# Patient Record
Sex: FEMALE | Race: WHITE | NOT HISPANIC OR LATINO | Employment: OTHER | URBAN - METROPOLITAN AREA
[De-identification: names, ages, dates, MRNs, and addresses within clinical notes are randomized per-mention and may not be internally consistent; named-entity substitution may affect disease eponyms.]

---

## 2017-09-27 ENCOUNTER — TRANSCRIBE ORDERS (OUTPATIENT)
Dept: ADMINISTRATIVE | Facility: HOSPITAL | Age: 77
End: 2017-09-27

## 2017-09-27 DIAGNOSIS — Z12.31 ENCOUNTER FOR MAMMOGRAM TO ESTABLISH BASELINE MAMMOGRAM: Primary | ICD-10-CM

## 2017-10-04 ENCOUNTER — HOSPITAL ENCOUNTER (OUTPATIENT)
Dept: MAMMOGRAPHY | Facility: CLINIC | Age: 77
Discharge: HOME | End: 2017-10-04
Payer: COMMERCIAL

## 2017-10-04 DIAGNOSIS — Z12.31 ENCOUNTER FOR MAMMOGRAM TO ESTABLISH BASELINE MAMMOGRAM: ICD-10-CM

## 2017-10-04 PROCEDURE — G0202 SCR MAMMO BI INCL CAD: HCPCS

## 2017-10-04 PROCEDURE — 77063 BREAST TOMOSYNTHESIS BI: CPT

## 2018-05-10 ENCOUNTER — OFFICE VISIT (OUTPATIENT)
Dept: OBGYN CLINIC | Facility: CLINIC | Age: 78
End: 2018-05-10
Payer: COMMERCIAL

## 2018-05-10 ENCOUNTER — APPOINTMENT (OUTPATIENT)
Dept: RADIOLOGY | Facility: CLINIC | Age: 78
End: 2018-05-10
Payer: COMMERCIAL

## 2018-05-10 VITALS — HEART RATE: 83 BPM | DIASTOLIC BLOOD PRESSURE: 75 MMHG | HEIGHT: 64 IN | SYSTOLIC BLOOD PRESSURE: 130 MMHG

## 2018-05-10 DIAGNOSIS — M25.562 LEFT KNEE PAIN, UNSPECIFIED CHRONICITY: ICD-10-CM

## 2018-05-10 DIAGNOSIS — M25.561 RIGHT KNEE PAIN, UNSPECIFIED CHRONICITY: ICD-10-CM

## 2018-05-10 DIAGNOSIS — M17.11 PRIMARY LOCALIZED OSTEOARTHRITIS OF RIGHT KNEE: Primary | ICD-10-CM

## 2018-05-10 DIAGNOSIS — M17.12 PRIMARY OSTEOARTHRITIS OF LEFT KNEE: ICD-10-CM

## 2018-05-10 DIAGNOSIS — M17.11 PRIMARY OSTEOARTHRITIS OF RIGHT KNEE: ICD-10-CM

## 2018-05-10 PROCEDURE — 99203 OFFICE O/P NEW LOW 30 MIN: CPT | Performed by: ORTHOPAEDIC SURGERY

## 2018-05-10 PROCEDURE — 20610 DRAIN/INJ JOINT/BURSA W/O US: CPT | Performed by: ORTHOPAEDIC SURGERY

## 2018-05-10 PROCEDURE — 73564 X-RAY EXAM KNEE 4 OR MORE: CPT

## 2018-05-10 RX ORDER — BUPROPION HYDROCHLORIDE 75 MG/1
TABLET ORAL
Refills: 3 | COMMUNITY
Start: 2018-05-05 | End: 2018-08-20

## 2018-05-10 RX ORDER — BETAMETHASONE SODIUM PHOSPHATE AND BETAMETHASONE ACETATE 3; 3 MG/ML; MG/ML
6 INJECTION, SUSPENSION INTRA-ARTICULAR; INTRALESIONAL; INTRAMUSCULAR; SOFT TISSUE
Status: COMPLETED | OUTPATIENT
Start: 2018-05-10 | End: 2018-05-10

## 2018-05-10 RX ORDER — PRAVASTATIN SODIUM 40 MG
TABLET ORAL
Refills: 3 | COMMUNITY
Start: 2018-02-27

## 2018-05-10 RX ORDER — LIDOCAINE HYDROCHLORIDE 10 MG/ML
5 INJECTION, SOLUTION INFILTRATION; PERINEURAL
Status: COMPLETED | OUTPATIENT
Start: 2018-05-10 | End: 2018-05-10

## 2018-05-10 RX ORDER — LOSARTAN POTASSIUM 50 MG/1
TABLET ORAL
COMMUNITY
End: 2018-08-20

## 2018-05-10 RX ORDER — CITALOPRAM 20 MG/1
20 TABLET ORAL DAILY
Refills: 11 | COMMUNITY
Start: 2018-03-14

## 2018-05-10 RX ORDER — MAGNESIUM 200 MG
TABLET ORAL
COMMUNITY

## 2018-05-10 RX ADMIN — BETAMETHASONE SODIUM PHOSPHATE AND BETAMETHASONE ACETATE 6 MG: 3; 3 INJECTION, SUSPENSION INTRA-ARTICULAR; INTRALESIONAL; INTRAMUSCULAR; SOFT TISSUE at 13:59

## 2018-05-10 RX ADMIN — LIDOCAINE HYDROCHLORIDE 5 ML: 10 INJECTION, SOLUTION INFILTRATION; PERINEURAL at 13:59

## 2018-05-10 NOTE — ASSESSMENT & PLAN NOTE
Patient with mild left knee osteoarthritis  She has done very well after cortisone injection a year ago  She will continue her activities and exercise  I will see her back as needed

## 2018-05-10 NOTE — ASSESSMENT & PLAN NOTE
Primary right knee osteoarthritis, mild to moderate  We discussed treatment options  She would like to proceed with a cortisone injection  Please refer to the procedure note  She was counseled on continuing her exercise, icing the knee, and resting as necessary  Follow-up as needed  We also discussed viscosupplementation

## 2018-05-10 NOTE — PROGRESS NOTES
Assessment:     1  Right knee pain, unspecified chronicity    2  Left knee pain, unspecified chronicity          Plan:     Problem List Items Addressed This Visit        Musculoskeletal and Integument    Primary osteoarthritis of right knee - Primary     Primary right knee osteoarthritis, mild to moderate  We discussed treatment options  She would like to proceed with a cortisone injection  Please refer to the procedure note  She was counseled on continuing her exercise, icing the knee, and resting as necessary  Follow-up as needed  We also discussed viscosupplementation  Primary osteoarthritis of left knee     Patient with mild left knee osteoarthritis  She has done very well after cortisone injection a year ago  She will continue her activities and exercise  I will see her back as needed  Relevant Orders    XR knee 4+ vw left injury           Patient ID: Jagdish Pizarro is a 76 y o  female  Chief Complaint:  Bilateral knee pain    HPI:  58-year-old female with bilateral knee pain  Left knee use to bother her more, but she had a cortisone injection about a year ago  Since then it has been much improved  She does feel better comfortable she standing on it for long periods of time, but overall does not have trouble with that  The right knee bothers her much more than the left  She has never had an injection here  Her primary issue was that the pain wakes her up in the middle of the night  When she is active, walking, and exercising regularly which she does, her knee does not bother her too much  She does note that twisting on the knee is painful and when she does exercises which she weeks in and out of the chairs she has to be careful  She does ice the knee regularly  She does find that he sometimes helps it more  She has not done any physical therapy recently  Patient's medical intake form was reviewed          Allergy:  Allergies   Allergen Reactions    Ace Inhibitors     Azithromycin Nausea Only    Celecoxib Hives    Penicillins Hives    Sulfa Antibiotics Hives       Medications:  all current active meds have been reviewed    Past Medical History:  Past Medical History:   Diagnosis Date    Cancer ([address] )     Hypertension     Osteoarthritis        Past Surgical History:  Past Surgical History:   Procedure Laterality Date    CARPAL TUNNEL RELEASE Bilateral     HIP SURGERY      HYSTERECTOMY      JOINT REPLACEMENT         Family History:  Family History   Problem Relation Age of Onset    Cancer Mother     Cancer Father     Cancer Sister        Social History:  History   Alcohol use Not on file     History   Drug use: Unknown     History   Smoking Status    Former Smoker   Smokeless Tobacco    Never Used           ROS:  Review of Systems   Constitutional: Negative  HENT: Negative  Eyes: Negative  Respiratory: Negative  Gastrointestinal: Negative  Endocrine: Positive for polyuria  Genitourinary: Negative  Musculoskeletal: Positive for arthralgias  Skin: Negative  Allergic/Immunologic: Negative  Neurological: Negative  Hematological: Negative  Psychiatric/Behavioral: The patient is nervous/anxious  Objective:  BP Readings from Last 1 Encounters:   05/10/18 130/75      Wt Readings from Last 1 Encounters:   No data found for Wt        BMI:   There is no height or weight on file to calculate BMI  EXAM:   Physical Exam   Constitutional: She is oriented to person, place, and time  She appears well-developed and well-nourished  No distress  HENT:   Head: Normocephalic and atraumatic  Eyes: Right eye exhibits no discharge  Left eye exhibits no discharge  Neck: Normal range of motion  Neck supple  No tracheal deviation present  Cardiovascular: Normal rate and regular rhythm  Pulmonary/Chest: Effort normal and breath sounds normal  No respiratory distress  She exhibits no tenderness  Abdominal: Soft  She exhibits no distension  There is no tenderness  Neurological: She is alert and oriented to person, place, and time  Skin: Skin is warm and dry  She is not diaphoretic  No erythema  Psychiatric: She has a normal mood and affect  Her behavior is normal    Vitals reviewed  Right Knee Exam     Comments:  Full range of motion, no effusion, no swelling, mild tenderness over the pes bursa, mild tenderness over the medial joint line, mild crepitus on knee range of motion with slight lateral patellar tracking, negative varus and valgus stress, negative Lachman, negative posterior drawer, negative Hui's      Left Knee Exam     Comments:  Full range of motion, no effusion, no swelling, no tenderness over the pes bursa, no tenderness over the medial joint line, no tenderness, mild crepitus on knee range of motion with slight lateral patellar tracking, negative varus and valgus stress, negative Lachman, negative posterior drawer, negative Hui's              Radiographs:  I have personally reviewed pertinent films in PACS and my interpretation is Mild osteoarthritis of the bilateral knees primarily the medial compartment on the right with some mild patellofemoral involvement, on the left is more the lateral compartment      Large joint arthrocentesis  Date/Time: 5/10/2018 1:59 PM  Consent given by: patient  Timeout: Immediately prior to procedure a time out was called to verify the correct patient, procedure, equipment, support staff and site/side marked as required   Supporting Documentation  Indications: pain   Procedure Details  Location: knee - R knee  Preparation: Patient was prepped and draped in the usual sterile fashion  Needle size: 22 G  Ultrasound guidance: no  Approach: superior  Medications administered: 6 mg betamethasone acetate-betamethasone sodium phosphate 6 (3-3) mg/mL; 5 mL lidocaine 1 %    Patient tolerance: patient tolerated the procedure well with no immediate complications  Dressing:  Sterile dressing applied

## 2018-08-20 ENCOUNTER — OFFICE VISIT (OUTPATIENT)
Dept: URGENT CARE | Facility: CLINIC | Age: 78
End: 2018-08-20
Payer: COMMERCIAL

## 2018-08-20 ENCOUNTER — APPOINTMENT (OUTPATIENT)
Dept: RADIOLOGY | Facility: CLINIC | Age: 78
End: 2018-08-20
Payer: COMMERCIAL

## 2018-08-20 VITALS
SYSTOLIC BLOOD PRESSURE: 152 MMHG | RESPIRATION RATE: 16 BRPM | OXYGEN SATURATION: 99 % | TEMPERATURE: 98 F | WEIGHT: 148 LBS | DIASTOLIC BLOOD PRESSURE: 69 MMHG | HEIGHT: 65 IN | BODY MASS INDEX: 24.66 KG/M2 | HEART RATE: 88 BPM

## 2018-08-20 DIAGNOSIS — T14.8XXA TORN TENDON: Primary | ICD-10-CM

## 2018-08-20 DIAGNOSIS — M79.645 FINGER PAIN, LEFT: ICD-10-CM

## 2018-08-20 PROCEDURE — 99213 OFFICE O/P EST LOW 20 MIN: CPT | Performed by: PREVENTIVE MEDICINE

## 2018-08-20 PROCEDURE — 73130 X-RAY EXAM OF HAND: CPT

## 2018-08-20 RX ORDER — LEVOTHYROXINE SODIUM 100 MCG
100 TABLET ORAL DAILY
Refills: 6 | COMMUNITY
Start: 2018-08-13

## 2018-08-20 NOTE — PROGRESS NOTES
[email or web address]        NAME: Jagdish Pizarro is a 76 y o  female  : 1940    MRN: [id number]  DATE: 2018  TIME: 11:04 AM    Assessment and Plan   Torn tendon [T14  8XXA]  1  Torn tendon     2  Finger pain, left  XR finger left fourth digit-ring         Patient Instructions       Follow up with PCP in 3-5 days  Proceed to  ER if symptoms worsen  Chief Complaint     Chief Complaint   Patient presents with    Hand Pain     Pt c/o left ring finger stiffness and pain for about 2 weeks  Pt states she is unable to straighten it  Denies injury  History of Present Illness       Two weeks ago she was pushing herself up using her left hand and felt an acute pain in the left hand  Since that time her left ring finger is stuck in a curved shape and she has difficulty extending it except passively          Review of Systems   Review of Systems      Current Medications       Current Outpatient Prescriptions:     citalopram (CeleXA) 20 mg tablet, Take 20 mg by mouth daily, Disp: , Rfl: 11    Magnesium 200 MG TABS, magnesium 200 mg tablet, Disp: , Rfl:     pravastatin (PRAVACHOL) 40 mg tablet, TAKE 1/2 TABLET BY MOUTH ONCE A WEEK, Disp: , Rfl: 3    SYNTHROID 100 MCG tablet, Take 100 mcg by mouth daily, Disp: , Rfl: 6    Current Allergies     Allergies as of 2018 - Reviewed 2018   Allergen Reaction Noted    Ace inhibitors      Azithromycin Nausea Only     Celecoxib Hives     Penicillins Hives     Sulfa antibiotics Hives             The following portions of the patient's history were reviewed and updated as appropriate: allergies, current medications, past family history, past medical history, past social history, past surgical history and problem list      Past Medical History:   Diagnosis Date    Cancer ([address] )     Hypertension     Osteoarthritis        Past Surgical History:   Procedure Laterality Date    CARPAL TUNNEL RELEASE Bilateral     HIP SURGERY      HYSTERECTOMY  JOINT REPLACEMENT         Family History   Problem Relation Age of Onset    Cancer Mother     Cancer Father     Cancer Sister          Medications have been verified  Objective   /69   Pulse 88   Temp 98 °F (36 7 °C)   Resp 16   Ht 5' 4 5" (1 638 m)   Wt 67 1 kg (148 lb)   SpO2 99%   BMI 25 01 kg/m²        Physical Exam     Physical Exam   Musculoskeletal:   The left ring finger is not warm red or swollen  The left ring finger seems stuck in a flexion position and she cannot extend that except passively       X-ray shows no acute changes

## 2018-10-09 ENCOUNTER — TRANSCRIBE ORDERS (OUTPATIENT)
Dept: ADMINISTRATIVE | Facility: HOSPITAL | Age: 78
End: 2018-10-09

## 2018-10-09 DIAGNOSIS — Z12.39 BREAST SCREENING: Primary | ICD-10-CM

## 2018-10-15 ENCOUNTER — HOSPITAL ENCOUNTER (OUTPATIENT)
Dept: MAMMOGRAPHY | Facility: CLINIC | Age: 78
Discharge: HOME | End: 2018-10-15
Payer: COMMERCIAL

## 2018-10-15 DIAGNOSIS — Z12.39 BREAST SCREENING: ICD-10-CM

## 2018-10-15 PROCEDURE — 77063 BREAST TOMOSYNTHESIS BI: CPT

## 2018-10-15 PROCEDURE — 77067 SCR MAMMO BI INCL CAD: CPT

## 2019-03-20 ENCOUNTER — TRANSCRIBE ORDERS (OUTPATIENT)
Dept: ADMINISTRATIVE | Facility: HOSPITAL | Age: 79
End: 2019-03-20

## 2019-03-20 DIAGNOSIS — Z13.820 SCREENING FOR OSTEOPOROSIS: Primary | ICD-10-CM

## 2019-04-01 ENCOUNTER — HOSPITAL ENCOUNTER (OUTPATIENT)
Dept: MAMMOGRAPHY | Facility: CLINIC | Age: 79
Discharge: HOME | End: 2019-04-01
Payer: COMMERCIAL

## 2019-04-01 DIAGNOSIS — Z13.820 SCREENING FOR OSTEOPOROSIS: ICD-10-CM

## 2019-04-01 PROCEDURE — 77080 DXA BONE DENSITY AXIAL: CPT

## 2019-10-21 ENCOUNTER — TRANSCRIBE ORDERS (OUTPATIENT)
Dept: ADMINISTRATIVE | Facility: HOSPITAL | Age: 79
End: 2019-10-21

## 2019-10-21 DIAGNOSIS — Z12.31 SCREENING MAMMOGRAM FOR HIGH-RISK PATIENT: Primary | ICD-10-CM

## 2019-11-15 ENCOUNTER — HOSPITAL ENCOUNTER (OUTPATIENT)
Dept: MAMMOGRAPHY | Facility: CLINIC | Age: 79
Discharge: HOME | End: 2019-11-15
Payer: COMMERCIAL

## 2019-11-15 VITALS — WEIGHT: 143 LBS | BODY MASS INDEX: 24.41 KG/M2 | HEIGHT: 64 IN

## 2019-11-15 DIAGNOSIS — Z12.31 SCREENING MAMMOGRAM FOR HIGH-RISK PATIENT: ICD-10-CM

## 2019-11-15 PROCEDURE — 77067 SCR MAMMO BI INCL CAD: CPT

## 2019-11-15 PROCEDURE — 77063 BREAST TOMOSYNTHESIS BI: CPT

## 2019-11-26 ENCOUNTER — HOSPITAL ENCOUNTER (OUTPATIENT)
Dept: MAMMOGRAPHY | Facility: CLINIC | Age: 79
Discharge: HOME | End: 2019-11-26
Payer: COMMERCIAL

## 2019-11-26 ENCOUNTER — HOSPITAL ENCOUNTER (OUTPATIENT)
Dept: ULTRASOUND IMAGING | Facility: CLINIC | Age: 79
Discharge: HOME | End: 2019-11-26
Payer: COMMERCIAL

## 2019-11-26 VITALS — WEIGHT: 143 LBS | HEIGHT: 64 IN | BODY MASS INDEX: 24.41 KG/M2

## 2019-11-26 DIAGNOSIS — R92.8 ABNORMAL MAMMOGRAM: ICD-10-CM

## 2019-11-26 PROCEDURE — G0279 TOMOSYNTHESIS, MAMMO: HCPCS

## 2019-11-26 PROCEDURE — 77065 DX MAMMO INCL CAD UNI: CPT

## 2020-11-24 ENCOUNTER — TRANSCRIBE ORDERS (OUTPATIENT)
Dept: ADMINISTRATIVE | Facility: HOSPITAL | Age: 80
End: 2020-11-24

## 2020-11-24 DIAGNOSIS — Z12.31 ENCOUNTER FOR SCREENING MAMMOGRAM FOR MALIGNANT NEOPLASM OF BREAST: Primary | ICD-10-CM

## 2020-12-21 ENCOUNTER — HOSPITAL ENCOUNTER (OUTPATIENT)
Dept: MAMMOGRAPHY | Facility: CLINIC | Age: 80
Discharge: HOME | End: 2020-12-21
Payer: COMMERCIAL

## 2020-12-21 DIAGNOSIS — Z12.31 ENCOUNTER FOR SCREENING MAMMOGRAM FOR MALIGNANT NEOPLASM OF BREAST: ICD-10-CM

## 2020-12-21 PROCEDURE — 77067 SCR MAMMO BI INCL CAD: CPT

## 2020-12-21 PROCEDURE — 77063 BREAST TOMOSYNTHESIS BI: CPT

## 2021-12-28 ENCOUNTER — HOSPITAL ENCOUNTER (OUTPATIENT)
Dept: MAMMOGRAPHY | Facility: CLINIC | Age: 81
Discharge: HOME | End: 2021-12-28
Payer: COMMERCIAL

## 2021-12-28 VITALS — WEIGHT: 143.08 LBS | HEIGHT: 64 IN | BODY MASS INDEX: 24.43 KG/M2

## 2021-12-28 DIAGNOSIS — Z12.31 ENCOUNTER FOR SCREENING MAMMOGRAM FOR MALIGNANT NEOPLASM OF BREAST: ICD-10-CM

## 2021-12-28 PROCEDURE — 77067 SCR MAMMO BI INCL CAD: CPT

## 2021-12-28 PROCEDURE — 77063 BREAST TOMOSYNTHESIS BI: CPT

## 2022-01-24 ENCOUNTER — HOSPITAL ENCOUNTER (OUTPATIENT)
Dept: ULTRASOUND IMAGING | Facility: CLINIC | Age: 82
Discharge: HOME | End: 2022-01-24
Payer: COMMERCIAL

## 2022-01-24 ENCOUNTER — HOSPITAL ENCOUNTER (OUTPATIENT)
Dept: MAMMOGRAPHY | Facility: CLINIC | Age: 82
Discharge: HOME | End: 2022-01-24
Payer: COMMERCIAL

## 2022-01-24 VITALS — BODY MASS INDEX: 24.41 KG/M2 | HEIGHT: 64 IN | WEIGHT: 143 LBS

## 2022-01-24 DIAGNOSIS — R92.8 ABNORMAL SCREENING MAMMOGRAM: ICD-10-CM

## 2022-01-24 PROCEDURE — 76642 ULTRASOUND BREAST LIMITED: CPT

## 2022-01-24 PROCEDURE — 77065 DX MAMMO INCL CAD UNI: CPT

## 2022-01-24 PROCEDURE — G0279 TOMOSYNTHESIS, MAMMO: HCPCS

## 2022-06-05 ENCOUNTER — APPOINTMENT (EMERGENCY)
Dept: RADIOLOGY | Facility: HOSPITAL | Age: 82
End: 2022-06-05
Payer: COMMERCIAL

## 2022-06-05 ENCOUNTER — HOSPITAL ENCOUNTER (EMERGENCY)
Facility: HOSPITAL | Age: 82
Discharge: LEFT BEFORE DISCHARGE | End: 2022-06-05
Attending: EMERGENCY MEDICINE
Payer: COMMERCIAL

## 2022-06-05 VITALS
DIASTOLIC BLOOD PRESSURE: 56 MMHG | TEMPERATURE: 98.2 F | OXYGEN SATURATION: 100 % | BODY MASS INDEX: 24.41 KG/M2 | HEART RATE: 85 BPM | SYSTOLIC BLOOD PRESSURE: 152 MMHG | WEIGHT: 143 LBS | HEIGHT: 64 IN | RESPIRATION RATE: 12 BRPM

## 2022-06-05 DIAGNOSIS — R07.9 CHEST PAIN: Primary | ICD-10-CM

## 2022-06-05 DIAGNOSIS — Z53.29 LEFT AGAINST MEDICAL ADVICE: ICD-10-CM

## 2022-06-05 PROCEDURE — 71045 X-RAY EXAM CHEST 1 VIEW: CPT

## 2022-06-05 PROCEDURE — 93005 ELECTROCARDIOGRAM TRACING: CPT

## 2022-06-05 PROCEDURE — 99285 EMERGENCY DEPT VISIT HI MDM: CPT | Performed by: EMERGENCY MEDICINE

## 2022-06-05 PROCEDURE — 99285 EMERGENCY DEPT VISIT HI MDM: CPT

## 2022-06-06 NOTE — ED NOTES
Pt stated that she is feeling better  Pt states that she doesn't want to be stuck for blood work  Pt states that she doesn't feel like she needs to be here and wants to go home   RN made Er doctor aware      Caitlin Hardy RN  06/05/22 2057

## 2022-06-06 NOTE — DISCHARGE INSTRUCTIONS
Return to the ER if your symptoms worsen or you change your mind about having further evaluation and testing done as was recommended

## 2022-06-06 NOTE — ED PROVIDER NOTES
History  Chief Complaint   Patient presents with    Chest Pain     Pt arrived to ED from home with c/o chest pain (not active) 1830 for approx 1 5 hours  This is an 33-year-old female presents for evaluation of substernal chest pressure that started at 6:30 p m  this evening while cooking and resolved spontaneously after 40 minutes  No nausea vomiting shortness of breath or fevers  She is a former smoker and is currently under treatment for hypercholesterolemia  Does have a history of indigestion and took Tylenol  History provided by:  Patient  Medical Problem  Location:  Substernal  Quality:  Pressure  Severity:  Moderate  Onset quality:  Sudden  Duration:  40 minutes  Chronicity:  New  Context:  Substernal chest pain  Relieved by:  Time  Associated symptoms: chest pain        Prior to Admission Medications   Prescriptions Last Dose Informant Patient Reported? Taking? Magnesium 200 MG TABS  Self Yes No   Sig: magnesium 200 mg tablet   SYNTHROID 100 MCG tablet   Yes No   Sig: Take 100 mcg by mouth daily   citalopram (CeleXA) 20 mg tablet  Self Yes No   Sig: Take 20 mg by mouth daily   pravastatin (PRAVACHOL) 40 mg tablet  Self Yes No   Sig: TAKE 1/2 TABLET BY MOUTH ONCE A WEEK      Facility-Administered Medications: None       Past Medical History:   Diagnosis Date    [facility]    Bladder    Hypertension     Osteoarthritis        Past Surgical History:   Procedure Laterality Date    CARPAL TUNNEL RELEASE Bilateral     HIP SURGERY      HYSTERECTOMY      age 41    JOINT REPLACEMENT      OOPHORECTOMY Right     age 41       Family History   Problem Relation Age of Onset    Ovarian cancer Mother 64    Cancer Father 78        unsure primary   suspects colon    Breast cancer Sister 66    No Known Problems Maternal Grandmother     No Known Problems Maternal Grandfather     Breast cancer Paternal Grandmother 67    Cancer Paternal Grandfather 80        unsure primary    Colon cancer Maternal Aunt 80    No Known Problems Maternal Aunt     No Known Problems Maternal Aunt     Cancer Paternal Aunt         unknown primary or age of onset    Throat cancer Son 48    No Known Problems Son     No Known Problems Son     No Known Problems Son     Cancer Paternal Aunt         unknown primary or age of onset   Judithe Spruce Cancer Paternal Aunt         unknown primary or age of onset   Judithe Spruce Cancer Paternal Aunt         unknown primary or age of onset   Judithe Spruce Cancer Paternal Aunt         unknown primary or age of onset     I have reviewed and agree with the history as documented  E-Cigarette/Vaping    E-Cigarette Use Never User      E-Cigarette/Vaping Substances    Nicotine No     THC No     CBD No     Flavoring No     Other No     Unknown No      Social History     Tobacco Use    Smoking status: Former Smoker    Smokeless tobacco: Never Used   Vaping Use    Vaping Use: Never used   Substance Use Topics    Alcohol use: Yes     Alcohol/week: 1 0 standard drink     Types: 1 Cans of beer per week    Drug use: Never       Review of Systems   Cardiovascular: Positive for chest pain  All other systems reviewed and are negative  Physical Exam  Physical Exam  Vitals and nursing note reviewed  Constitutional:       General: She is not in acute distress  Appearance: She is not ill-appearing, toxic-appearing or diaphoretic  HENT:      Head: Normocephalic and atraumatic  Right Ear: External ear normal       Left Ear: External ear normal    Eyes:      General: No scleral icterus  Right eye: No discharge  Left eye: No discharge  Extraocular Movements: Extraocular movements intact  Pupils: Pupils are equal, round, and reactive to light  Cardiovascular:      Rate and Rhythm: Normal rate and regular rhythm  Pulses: Normal pulses  Heart sounds: No murmur heard  No friction rub  No gallop     Pulmonary:      Effort: Pulmonary effort is normal  No respiratory distress  Breath sounds: No stridor  No wheezing, rhonchi or rales  Abdominal:      General: There is no distension  Palpations: Abdomen is soft  Tenderness: There is no abdominal tenderness  There is no guarding or rebound  Musculoskeletal:         General: No swelling, tenderness, deformity or signs of injury  Normal range of motion  Cervical back: Normal range of motion and neck supple  No rigidity or tenderness  Right lower leg: No edema  Left lower leg: No edema  Skin:     General: Skin is warm and dry  Coloration: Skin is not jaundiced  Findings: No erythema or rash  Neurological:      General: No focal deficit present  Mental Status: She is alert and oriented to person, place, and time  Cranial Nerves: No cranial nerve deficit  Sensory: No sensory deficit  Motor: No weakness  Coordination: Coordination normal       Gait: Gait normal    Psychiatric:         Mood and Affect: Mood normal          Behavior: Behavior normal          Thought Content:  Thought content normal          Vital Signs  ED Triage Vitals [06/05/22 2022]   Temperature Pulse Respirations Blood Pressure SpO2   98 2 °F (36 8 °C) 83 16 166/71 99 %      Temp Source Heart Rate Source Patient Position - Orthostatic VS BP Location FiO2 (%)   Temporal Monitor Sitting Left arm --      Pain Score       No Pain           Vitals:    06/05/22 2022 06/05/22 2030   BP: 166/71 152/56   Pulse: 83 85   Patient Position - Orthostatic VS: Sitting          Visual Acuity      ED Medications  Medications - No data to display    Diagnostic Studies  Results Reviewed     None                 XR chest 1 view portable    (Results Pending)              Procedures  ECG 12 Lead Documentation Only    Date/Time: 6/5/2022 8:46 PM  Performed by: Nayely Green DO  Authorized by: Nayely Green DO     ECG reviewed by me, the ED Provider: yes    Patient location:  ED  Rate:     ECG rate:  87  Rhythm: Rhythm: sinus rhythm    Conduction:     Conduction: normal    T waves:     T waves: normal               ED Course  ED Course as of 06/05/22 2103   Estrellita Younger Jun 05, 2022 2049 Patient is a very hard IV stick and having difficulty obtaining blood work patient states that this always happens to her and that she does not want to stay to have any further evaluation done she understands the risk of heart attack and is able to make her own decisions and understand the consequences she will sign out against medical advice                                             MDM  Number of Diagnoses or Management Options  Chest pain  Left against medical advice  Diagnosis management comments: Chest pain rule out acute coronary syndrome versus indigestion musculoskeletal pain anxiety workup in progress EKG chest x-ray and lab work       Amount and/or Complexity of Data Reviewed  Clinical lab tests: ordered  Tests in the radiology section of CPT®: ordered    Patient Progress  Patient progress: (No acute cardiopulmonary disease seen on chest x-ray by me)      Disposition  Final diagnoses:   Chest pain   Left against medical advice     Time reflects when diagnosis was documented in both MDM as applicable and the Disposition within this note     Time User Action Codes Description Comment    6/5/2022  8:51 PM Hannah Colin [R07 [id number]] Chest pain     6/5/2022  8:51 PM Hannah Colin [[id number]] Left against medical advice       ED Disposition     ED Disposition   AMA    Condition   --    Date/Time   Sun Jun 5, 2022  8:51 PM    Comment   Date: 6/5/2022  Patient: Tsering Grey  Admitted: 6/5/2022  8:17 PM  Attending Provider: Tex Campos DO    Tsering Grey or her authorized caregiver has made the decision for the patient to leave the emergency department against the advice of  her attending physician  She or her authorized caregiver has been informed and understands the inherent risks, including death, heart attack and poor outcome  She or her authorized caregiver has decided to accept the responsibility for this emilie Ramsey and all necessary parties have been advised that she may return for further evaluation or treatment  Her condition at time of discharge was clear thinking and may able to make her own decisions and understand the consequences  Cat luz maria Tadeo had current vital signs as follows:  /56   Pulse 85   Temp 98 2 °F (36 8 °C) (Temporal)   Resp 12   Ht 5' 4" (1 626 m)   Wt 64 9 kg (143 lb)            Follow-up Information     Follow up With Specialties Details Why Contact Info Additional [address] [place], [place] Nurse Practitioner   [address] [facility] [phone]  [address] [facility]        [address] Emergency Department Emergency Medicine  As needed, If symptoms worsen [address] [phone]  [address] [facility], [address] [facility] [address]          Discharge Medication List as of 6/5/2022  8:52 PM      CONTINUE these medications which have NOT CHANGED    Details   citalopram (CeleXA) 20 mg tablet Take 20 mg by mouth daily, Starting Wed 3/14/2018, Historical Med      Magnesium 200 MG TABS magnesium 200 mg tablet, Historical Med      pravastatin (PRAVACHOL) 40 mg tablet TAKE 1/2 TABLET BY MOUTH ONCE A WEEK, Historical Med      SYNTHROID 100 MCG tablet Take 100 mcg by mouth daily, Starting Mon 8/13/2018, Historical Med             No discharge procedures on file      PDMP Review     None          ED Provider  Electronically Signed by           Ashu Ferrell DO  06/05/22 7028

## 2022-06-08 LAB
ATRIAL RATE: 87 BPM
P AXIS: 84 DEGREES
PR INTERVAL: 168 MS
QRS AXIS: 84 DEGREES
QRSD INTERVAL: 80 MS
QT INTERVAL: 348 MS
QTC INTERVAL: 418 MS
T WAVE AXIS: 71 DEGREES
VENTRICULAR RATE: 87 BPM

## 2022-06-08 PROCEDURE — 93010 ELECTROCARDIOGRAM REPORT: CPT | Performed by: INTERNAL MEDICINE

## 2023-02-08 ENCOUNTER — HOSPITAL ENCOUNTER (OUTPATIENT)
Dept: BONE DENSITY | Facility: CLINIC | Age: 83
Discharge: HOME | End: 2023-02-08

## 2023-02-08 ENCOUNTER — HOSPITAL ENCOUNTER (OUTPATIENT)
Dept: MAMMOGRAPHY | Facility: CLINIC | Age: 83
Discharge: HOME | End: 2023-02-08

## 2023-02-08 VITALS — HEIGHT: 64 IN | BODY MASS INDEX: 23.9 KG/M2 | WEIGHT: 140 LBS

## 2023-02-08 DIAGNOSIS — M85.80 OTHER SPECIFIED DISORDERS OF BONE DENSITY AND STRUCTURE, UNSPECIFIED SITE: ICD-10-CM

## 2023-02-08 DIAGNOSIS — Z12.31 SCREENING MAMMOGRAM, ENCOUNTER FOR: ICD-10-CM

## 2024-04-17 ENCOUNTER — HOSPITAL ENCOUNTER (OUTPATIENT)
Dept: MAMMOGRAPHY | Facility: CLINIC | Age: 84
Discharge: HOME | End: 2024-04-17
Payer: COMMERCIAL

## 2024-04-17 VITALS — BODY MASS INDEX: 23.9 KG/M2 | WEIGHT: 140 LBS | HEIGHT: 64 IN

## 2024-04-17 DIAGNOSIS — Z12.31 VISIT FOR SCREENING MAMMOGRAM: ICD-10-CM

## 2024-04-17 PROCEDURE — 77063 BREAST TOMOSYNTHESIS BI: CPT

## 2024-04-17 PROCEDURE — 77067 SCR MAMMO BI INCL CAD: CPT

## 2025-02-12 ENCOUNTER — TELEPHONE (OUTPATIENT)
Dept: GASTROENTEROLOGY | Facility: CLINIC | Age: 85
End: 2025-02-12

## 2025-02-12 RX ORDER — CHOLECALCIFEROL (VITAMIN D3) 25 MCG
TABLET ORAL
COMMUNITY

## 2025-02-12 RX ORDER — VIT C/B6/B5/MAGNESIUM/HERB 173 50-5-6-5MG
CAPSULE ORAL
COMMUNITY

## 2025-02-12 RX ORDER — ESTRADIOL 0.1 MG/G
CREAM VAGINAL
COMMUNITY
Start: 2024-11-06

## 2025-02-12 RX ORDER — BETAMETHASONE DIPROPIONATE 0.5 MG/G
CREAM TOPICAL 2 TIMES DAILY
COMMUNITY

## 2025-02-12 RX ORDER — LATANOPROST 50 UG/ML
SOLUTION/ DROPS OPHTHALMIC
COMMUNITY
Start: 2025-01-31

## 2025-02-12 RX ORDER — CETIRIZINE HYDROCHLORIDE 10 MG/1
10 TABLET, CHEWABLE ORAL DAILY
COMMUNITY

## 2025-02-26 ENCOUNTER — TELEPHONE (OUTPATIENT)
Dept: GASTROENTEROLOGY | Facility: CLINIC | Age: 85
End: 2025-02-26

## 2025-02-26 ENCOUNTER — OFFICE VISIT (OUTPATIENT)
Dept: GASTROENTEROLOGY | Facility: CLINIC | Age: 85
End: 2025-02-26
Payer: COMMERCIAL

## 2025-02-26 VITALS
WEIGHT: 138 LBS | BODY MASS INDEX: 23.56 KG/M2 | HEIGHT: 64 IN | SYSTOLIC BLOOD PRESSURE: 122 MMHG | DIASTOLIC BLOOD PRESSURE: 64 MMHG

## 2025-02-26 DIAGNOSIS — K59.04 CHRONIC IDIOPATHIC CONSTIPATION: ICD-10-CM

## 2025-02-26 DIAGNOSIS — K64.8 OTHER HEMORRHOIDS: ICD-10-CM

## 2025-02-26 DIAGNOSIS — D50.8 OTHER IRON DEFICIENCY ANEMIA: Primary | ICD-10-CM

## 2025-02-26 PROBLEM — C43.9 MELANOMA (HCC): Status: ACTIVE | Noted: 2025-02-26

## 2025-02-26 PROBLEM — C67.9 BLADDER CANCER (HCC): Status: ACTIVE | Noted: 2025-02-26

## 2025-02-26 PROBLEM — D64.9 ABSOLUTE ANEMIA: Status: ACTIVE | Noted: 2025-02-26

## 2025-02-26 PROCEDURE — 99204 OFFICE O/P NEW MOD 45 MIN: CPT | Performed by: INTERNAL MEDICINE

## 2025-02-26 RX ORDER — POLYETHYLENE GLYCOL 3350 17 G/17G
238 POWDER, FOR SOLUTION ORAL ONCE
Qty: 238 G | Refills: 0 | Status: SHIPPED | OUTPATIENT
Start: 2025-02-26 | End: 2025-02-26

## 2025-02-26 RX ORDER — SODIUM CHLORIDE, SODIUM LACTATE, POTASSIUM CHLORIDE, CALCIUM CHLORIDE 600; 310; 30; 20 MG/100ML; MG/100ML; MG/100ML; MG/100ML
125 INJECTION, SOLUTION INTRAVENOUS CONTINUOUS
OUTPATIENT
Start: 2025-02-26

## 2025-02-26 RX ORDER — FERROUS SULFATE 325(65) MG
325 TABLET, DELAYED RELEASE (ENTERIC COATED) ORAL EVERY OTHER DAY
COMMUNITY

## 2025-02-26 NOTE — ASSESSMENT & PLAN NOTE
84F here today with her son as a new patient at the request of CASEY Dykes for new iron deficiency anemia. CMP shows no evidence of CKD. Low ferritin. No GIB.     No other GI complaints.  Discussed the risks and benefits of proceeding with EGD and colonoscopy.  After discussing all the various options, patient and the son would like to proceed with an EGD and colonoscopy to assess the anemia.  Differential diagnosis includes peptic ulcer disease, AVMs, malignancy.    Continue iron supplementation, take daily with Colace.    Orders:  •  EGD; Future  •  Colonoscopy; Future  •  polyethylene glycol (MiraLax) 17 GM/SCOOP powder; Take 238 g by mouth once for 1 dose Take 238 g my mouth. Use as directed

## 2025-02-26 NOTE — PROGRESS NOTES
Name: Rosalba Wolfe      : 1940      MRN: [id number]  Encounter Provider: Sonia Calvillo MD  Encounter Date: 2025   Encounter department: [facility]  :  Assessment & Plan  Other iron deficiency anemia  84F here today with her son as a new patient at the request of CASEY Dykes for new iron deficiency anemia. CMP shows no evidence of CKD. Low ferritin. No GIB.     No other GI complaints.  Discussed the risks and benefits of proceeding with EGD and colonoscopy.  After discussing all the various options, patient and the son would like to proceed with an EGD and colonoscopy to assess the anemia.  Differential diagnosis includes peptic ulcer disease, AVMs, malignancy.    Continue iron supplementation, take daily with Colace.    Orders:  •  EGD; Future  •  Colonoscopy; Future  •  polyethylene glycol (MiraLax) 17 GM/SCOOP powder; Take 238 g by mouth once for 1 dose Take 238 g my mouth. Use as directed    Other hemorrhoids  Consider hemorrhoid banding post colonoscopy if needed.       Chronic idiopathic constipation  Recommend adding MiraLAX and Colace.           History of Present Illness     Rosalba Wolfe is a 84 y.o. female who presents today at the request of her PCP for iron deficiency anemia.  This is new.  Annual blood work this year in January showed new anemia, normocytic with low ferritin.  Patient denies any significant GI issues.  Says intermittently she gets constipated.  She is always had hemorrhoid bleeding for decades.  Last colonoscopy was in 2017.  She has never had the hemorrhoids banded.  She denies any nausea vomiting or abdominal pains.  She does not eat a lot of red meat.    History obtained from: patient    Review of Systems   Constitutional:  Negative for chills and fever.   HENT:  Negative for ear pain and sore throat.    Eyes:  Negative for pain and visual disturbance.   Respiratory:  Negative for cough and shortness of breath.     Cardiovascular:  Negative for chest pain and palpitations.   Gastrointestinal:  Negative for abdominal pain and vomiting.   Genitourinary:  Negative for dysuria and hematuria.   Musculoskeletal:  Negative for arthralgias and back pain.   Skin:  Negative for color change and rash.   Neurological:  Negative for seizures and syncope.   All other systems reviewed and are negative.      Past Medical History   Past Medical History:   Diagnosis Date   • Anemia    • Anxiety    • Cancer (HCC) 2015    Bladder   • Chronic constipation    • Environmental allergies    • GERD (gastroesophageal reflux disease)    • Hyperlipidemia    • Hypertension    • Hypothyroidism    • Irritable bowel syndrome    • Osteoarthritis    • Pelvic floor weakness in female    • Thyroid disorder      Past Surgical History:   Procedure Laterality Date   • CARPAL TUNNEL RELEASE Bilateral    • COLONOSCOPY  11/28/2017    Dr. Hughes   • HIP SURGERY     • HYSTERECTOMY      age 40   • JOINT REPLACEMENT     • OOPHORECTOMY Right     age 40     Family History   Problem Relation Age of Onset   • Ovarian cancer Mother 56   • Colon cancer Father    • Cancer Father 79        unsure primary. suspects colon   • No Known Problems Maternal Grandmother    • No Known Problems Maternal Grandfather    • Breast cancer Paternal Grandmother 72   • Cancer Paternal Grandfather 85        unsure primary   • Colon cancer Maternal Aunt 90   • No Known Problems Maternal Aunt    • No Known Problems Maternal Aunt    • Cancer Paternal Aunt         unknown primary or age of onset   • Cancer Paternal Aunt         unknown primary or age of onset   • Cancer Paternal Aunt         unknown primary or age of onset   • Cancer Paternal Aunt         unknown primary or age of onset   • Cancer Paternal Aunt         unknown primary or age of onset   • Throat cancer Son 50   • No Known Problems Son    • No Known Problems Son    • No Known Problems Son    • Colon polyps Neg Hx       reports that she  has quit smoking. She has never used smokeless tobacco. She reports current alcohol use of about 1.0 standard drink of alcohol per week. She reports that she does not use drugs.  Current Outpatient Medications   Medication Instructions   • betamethasone dipropionate (DIPROSONE) 0.05 % cream Topical, As needed   • cetirizine (ZYRTEC) 10 mg, Oral, As needed   • cholecalciferol (VITAMIN D3) 1,000 units tablet Oral   • citalopram (CELEXA) 20 mg, Daily   • estradiol (ESTRACE) 0.1 mg/g vaginal cream Vaginal   • ferrous sulfate 325 mg, Every other day   • latanoprost (XALATAN) 0.005 % ophthalmic solution INSTILL ONE DROP IN THE RIGHT EYE DAILY AT BEDTIME   • Magnesium 200 MG TABS magnesium 200 mg tablet   • polyethylene glycol (MIRALAX) 238 g, Oral, Once, Take 238 g my mouth. Use as directed   • Synthroid 75 mcg, Oral, Daily   • Turmeric 500 MG CAPS      Allergies   Allergen Reactions   • Ace Inhibitors    • Azithromycin Nausea Only   • Celecoxib Hives   • Penicillins Hives   • Sulfa Antibiotics Hives      Current Outpatient Medications on File Prior to Visit   Medication Sig Dispense Refill   • betamethasone dipropionate (DIPROSONE) 0.05 % cream Apply topically as needed     • cetirizine (ZyrTEC) 10 MG chewable tablet Chew 10 mg as needed     • cholecalciferol (VITAMIN D3) 1,000 units tablet Take by mouth     • citalopram (CeleXA) 20 mg tablet Take 20 mg by mouth daily  11   • estradiol (ESTRACE) 0.1 mg/g vaginal cream Insert into the vagina     • ferrous sulfate 325 (65 FE) MG EC tablet Take 325 mg by mouth every other day     • latanoprost (XALATAN) 0.005 % ophthalmic solution INSTILL ONE DROP IN THE RIGHT EYE DAILY AT BEDTIME     • Magnesium 200 MG TABS magnesium 200 mg tablet     • SYNTHROID 100 MCG tablet Take 75 mcg by mouth daily  6   • Turmeric 500 MG CAPS      • [DISCONTINUED] pravastatin (PRAVACHOL) 40 mg tablet TAKE 1/2 TABLET BY MOUTH ONCE A WEEK (Patient not taking: Reported on 2/26/2025)  3     No current  "facility-administered medications on file prior to visit.      Social History     Tobacco Use   • Smoking status: Former   • Smokeless tobacco: Never   Vaping Use   • Vaping status: Never Used   Substance and Sexual Activity   • Alcohol use: Yes     Alcohol/week: 1.0 standard drink of alcohol     Types: 1 Cans of beer per week   • Drug use: Never   • Sexual activity: Not Currently        Objective   /64   Ht 5' 4\" (1.626 m)   Wt 62.6 kg (138 lb)   BMI 23.69 kg/m²      Physical Exam  Vitals and nursing note reviewed.   Constitutional:       General: She is not in acute distress.     Appearance: She is well-developed.   HENT:      Head: Normocephalic and atraumatic.   Eyes:      General: No scleral icterus.     Conjunctiva/sclera: Conjunctivae normal.   Cardiovascular:      Rate and Rhythm: Normal rate and regular rhythm.      Heart sounds: Normal heart sounds. No murmur heard.  Pulmonary:      Effort: Pulmonary effort is normal. No respiratory distress.      Breath sounds: Normal breath sounds.   Abdominal:      General: Abdomen is flat. Bowel sounds are normal. There is no distension.      Palpations: Abdomen is soft.      Tenderness: There is no abdominal tenderness.   Musculoskeletal:         General: No swelling.      Cervical back: Neck supple.   Skin:     General: Skin is warm and dry.   Neurological:      General: No focal deficit present.      Mental Status: She is alert and oriented to person, place, and time.   Psychiatric:         Mood and Affect: Mood normal.           "

## 2025-02-26 NOTE — TELEPHONE ENCOUNTER
Scheduled date of colonoscopy (as of today):03/11/25  Physician performing colonoscopy:Shin  Location of colonoscopy:[facility]  Bowel prep reviewed with patient:Miralax  Instructions reviewed with patient by:verbal and folder - DS  Clearances: NONE

## 2025-02-27 ENCOUNTER — TELEPHONE (OUTPATIENT)
Dept: GASTROENTEROLOGY | Facility: CLINIC | Age: 85
End: 2025-02-27

## 2025-02-27 DIAGNOSIS — Z12.11 SCREENING FOR COLON CANCER: Primary | ICD-10-CM

## 2025-02-27 RX ORDER — SODIUM PICOSULFATE, MAGNESIUM OXIDE, AND ANHYDROUS CITRIC ACID 12; 3.5; 1 G/175ML; G/175ML; MG/175ML
LIQUID ORAL
Qty: 350 ML | Refills: 0 | Status: SHIPPED | OUTPATIENT
Start: 2025-02-27

## 2025-02-27 NOTE — TELEPHONE ENCOUNTER
Procedure confirmed  Colonoscopy/Endoscopy     Via: [facility]    Instructions given: Given to Patient at Visit     Prep Given: Miralax/Dulcolax    Call the office if there are any questions.

## 2025-02-27 NOTE — TELEPHONE ENCOUNTER
Pt is calling in to ask for an alternative prep to the iqra/dul. Pt is looking for an alternate prep and said she would prefer lower volume even if she needs to pay out of pocket. The pt does not like the taste of gatorade. Please reach back out to the pt with an alternate prep if possible and she would need the prep instructions for it as well

## 2025-03-03 NOTE — TELEPHONE ENCOUNTER
Pt calling because she would like to speak with Dr Calvillo in regards to concerns she has about her bowel prep. She states she has a prolapsed bladder and has some concerns about doing her prep with this condition. Pt can be reached at [phone].

## 2025-03-04 ENCOUNTER — ANESTHESIA EVENT (OUTPATIENT)
Dept: ANESTHESIOLOGY | Facility: AMBULATORY SURGERY CENTER | Age: 85
End: 2025-03-04

## 2025-03-04 ENCOUNTER — ANESTHESIA (OUTPATIENT)
Dept: ANESTHESIOLOGY | Facility: AMBULATORY SURGERY CENTER | Age: 85
End: 2025-03-04

## 2025-03-07 NOTE — TELEPHONE ENCOUNTER
Called pt and lvm that Dr. Calvillo said either prep was fine, Jan/Dul or Clenpiq. Jan and Clenpiq were both sent to Pharmacy, Dul needs to be purchased otc. If pt needs instructions I informed her to call us.

## 2025-03-10 ENCOUNTER — TELEPHONE (OUTPATIENT)
Age: 85
End: 2025-03-10

## 2025-03-10 NOTE — TELEPHONE ENCOUNTER
Patients GI provider:  Dr. Calvillo    Number to return call: [phone]    Reason for call: Pt calling for procedure diagnosis code for insurance purposes.    Scheduled procedure/appointment date if applicable: procedure 3/11

## 2025-03-11 ENCOUNTER — HOSPITAL ENCOUNTER (OUTPATIENT)
Dept: GASTROENTEROLOGY | Facility: AMBULATORY SURGERY CENTER | Age: 85
Discharge: HOME | End: 2025-03-11
Attending: INTERNAL MEDICINE
Payer: COMMERCIAL

## 2025-03-11 ENCOUNTER — ANESTHESIA EVENT (OUTPATIENT)
Dept: GASTROENTEROLOGY | Facility: AMBULATORY SURGERY CENTER | Age: 85
End: 2025-03-11

## 2025-03-11 ENCOUNTER — TELEPHONE (OUTPATIENT)
Dept: GASTROENTEROLOGY | Facility: CLINIC | Age: 85
End: 2025-03-11

## 2025-03-11 ENCOUNTER — ANESTHESIA (OUTPATIENT)
Dept: GASTROENTEROLOGY | Facility: AMBULATORY SURGERY CENTER | Age: 85
End: 2025-03-11

## 2025-03-11 VITALS
TEMPERATURE: 98.5 F | RESPIRATION RATE: 22 BRPM | WEIGHT: 138 LBS | OXYGEN SATURATION: 98 % | SYSTOLIC BLOOD PRESSURE: 132 MMHG | BODY MASS INDEX: 23.56 KG/M2 | DIASTOLIC BLOOD PRESSURE: 60 MMHG | HEIGHT: 64 IN | HEART RATE: 69 BPM

## 2025-03-11 DIAGNOSIS — D50.0 IRON DEFICIENCY ANEMIA DUE TO CHRONIC BLOOD LOSS: ICD-10-CM

## 2025-03-11 DIAGNOSIS — D50.8 OTHER IRON DEFICIENCY ANEMIA: ICD-10-CM

## 2025-03-11 PROCEDURE — 43239 EGD BIOPSY SINGLE/MULTIPLE: CPT | Performed by: INTERNAL MEDICINE

## 2025-03-11 PROCEDURE — 45378 DIAGNOSTIC COLONOSCOPY: CPT | Performed by: INTERNAL MEDICINE

## 2025-03-11 PROCEDURE — 88305 TISSUE EXAM BY PATHOLOGIST: CPT | Performed by: SPECIALIST

## 2025-03-11 RX ORDER — SODIUM CHLORIDE, SODIUM LACTATE, POTASSIUM CHLORIDE, CALCIUM CHLORIDE 600; 310; 30; 20 MG/100ML; MG/100ML; MG/100ML; MG/100ML
125 INJECTION, SOLUTION INTRAVENOUS CONTINUOUS
Status: DISCONTINUED | OUTPATIENT
Start: 2025-03-11 | End: 2025-03-15 | Stop reason: HOSPADM

## 2025-03-11 RX ORDER — SODIUM CHLORIDE, SODIUM LACTATE, POTASSIUM CHLORIDE, CALCIUM CHLORIDE 600; 310; 30; 20 MG/100ML; MG/100ML; MG/100ML; MG/100ML
INJECTION, SOLUTION INTRAVENOUS CONTINUOUS PRN
Status: DISCONTINUED | OUTPATIENT
Start: 2025-03-11 | End: 2025-03-11

## 2025-03-11 RX ORDER — LIDOCAINE HYDROCHLORIDE 20 MG/ML
INJECTION, SOLUTION EPIDURAL; INFILTRATION; INTRACAUDAL; PERINEURAL AS NEEDED
Status: DISCONTINUED | OUTPATIENT
Start: 2025-03-11 | End: 2025-03-11

## 2025-03-11 RX ORDER — PROPOFOL 10 MG/ML
INJECTION, EMULSION INTRAVENOUS AS NEEDED
Status: DISCONTINUED | OUTPATIENT
Start: 2025-03-11 | End: 2025-03-11

## 2025-03-11 RX ADMIN — LIDOCAINE HYDROCHLORIDE 100 MG: 20 INJECTION, SOLUTION EPIDURAL; INFILTRATION; INTRACAUDAL; PERINEURAL at 11:04

## 2025-03-11 RX ADMIN — SODIUM CHLORIDE, SODIUM LACTATE, POTASSIUM CHLORIDE, CALCIUM CHLORIDE 500 ML: 600; 310; 30; 20 INJECTION, SOLUTION INTRAVENOUS at 11:00

## 2025-03-11 RX ADMIN — SODIUM CHLORIDE, SODIUM LACTATE, POTASSIUM CHLORIDE, CALCIUM CHLORIDE: 600; 310; 30; 20 INJECTION, SOLUTION INTRAVENOUS at 10:52

## 2025-03-11 RX ADMIN — PROPOFOL 20 MG: 10 INJECTION, EMULSION INTRAVENOUS at 11:17

## 2025-03-11 RX ADMIN — PROPOFOL 20 MG: 10 INJECTION, EMULSION INTRAVENOUS at 11:22

## 2025-03-11 RX ADMIN — PROPOFOL 20 MG: 10 INJECTION, EMULSION INTRAVENOUS at 11:11

## 2025-03-11 RX ADMIN — PROPOFOL 100 MG: 10 INJECTION, EMULSION INTRAVENOUS at 11:04

## 2025-03-11 NOTE — H&P
History and Physical - [facility]    Rosalba Wolfe 84 y.o. female MRN: [id number]      HPI: Rosalba Wolfe is a 84 y.o. female who presents for new iron deficiency anemia.    Allergies   Allergen Reactions    Ace Inhibitors     Azithromycin Nausea Only    Celecoxib Hives    Penicillins Hives    Sulfa Antibiotics Hives         REVIEW OF SYSTEMS: Per the HPI, and otherwise unremarkable.    Historical Information     Past Medical History:   Diagnosis Date    Anemia     Anxiety     Cancer (HCC) 2015    Bladder    Chronic constipation     Disease of thyroid gland     Environmental allergies     GERD (gastroesophageal reflux disease)     Hyperlipidemia     Hypertension     Hypothyroidism     Irritable bowel syndrome     Osteoarthritis     Pelvic floor weakness in female     Thyroid disorder      Past Surgical History:   Procedure Laterality Date    CARPAL TUNNEL RELEASE Bilateral     COLONOSCOPY  11/28/2017    Dr. Hughes    HIP SURGERY      HYSTERECTOMY      age 40    JOINT REPLACEMENT Left     hip    OOPHORECTOMY Right     age 40     Social History   Social History     Substance and Sexual Activity   Alcohol Use Yes    Comment: rare     Social History     Substance and Sexual Activity   Drug Use Never     Social History     Tobacco Use   Smoking Status Former   Smokeless Tobacco Never     Family History   Problem Relation Age of Onset    Ovarian cancer Mother 56    Colon cancer Father     Cancer Father 79        unsure primary. suspects colon    No Known Problems Maternal Grandmother     No Known Problems Maternal Grandfather     Breast cancer Paternal Grandmother 72    Cancer Paternal Grandfather 85        unsure primary    Colon cancer Maternal Aunt 90    No Known Problems Maternal Aunt     No Known Problems Maternal Aunt     Cancer Paternal Aunt         unknown primary or age of onset    Cancer Paternal Aunt         unknown primary or age of onset    Cancer Paternal Aunt      "    unknown primary or age of onset    Cancer Paternal Aunt         unknown primary or age of onset    Cancer Paternal Aunt         unknown primary or age of onset    Throat cancer Son 50    No Known Problems Son     No Known Problems Son     No Known Problems Son     Colon polyps Neg Hx        Meds/Allergies       Current Outpatient Medications:     betamethasone dipropionate (DIPROSONE) 0.05 % cream    cetirizine (ZyrTEC) 10 MG chewable tablet    cholecalciferol (VITAMIN D3) 1,000 units tablet    citalopram (CeleXA) 20 mg tablet    estradiol (ESTRACE) 0.1 mg/g vaginal cream    ferrous sulfate 325 (65 FE) MG EC tablet    latanoprost (XALATAN) 0.005 % ophthalmic solution    Magnesium 200 MG TABS    polyethylene glycol (MiraLax) 17 GM/SCOOP powder    sodium picosulfate, magnesium oxide, citric acid (Clenpiq) oral solution    SYNTHROID 100 MCG tablet    Turmeric 500 MG CAPS    Current Facility-Administered Medications:     lactated ringers infusion, 125 mL/hr, Intravenous, Continuous    Facility-Administered Medications Ordered in Other Encounters:     lactated ringers infusion, , Intravenous, Continuous PRN, New Bag at 03/11/25 1052        Objective     /72   Pulse 92   Temp 98.5 °F (36.9 °C) (Temporal)   Resp 20   Ht 5' 4\" (1.626 m)   Wt 62.6 kg (138 lb)   SpO2 100%   BMI 23.69 kg/m²       PHYSICAL EXAM    Gen: NAD AAOx3  Head: Normocephalic, Atraumatic  CV: S1S2 RRR no m/r/g  CHEST: Clear b/l no c/r/w  ABD: soft, +BS NT/ND no masses  EXT: no edema      ASSESSMENT/PLAN:  This is a 84 y.o. year old female here for egd/colon, and she is stable and optimized for her procedure.        "

## 2025-03-11 NOTE — ANESTHESIA PREPROCEDURE EVALUATION
Procedure:  EGD  COLONOSCOPY    Relevant Problems   HEMATOLOGY   (+) Absolute anemia      MUSCULOSKELETAL   (+) Primary osteoarthritis of left knee   (+) Primary osteoarthritis of right knee        Physical Exam    Airway    Mallampati score: II  TM Distance: >3 FB  Neck ROM: full     Dental   No notable dental hx     Cardiovascular  Rhythm: regular, Rate: normal    Pulmonary   Breath sounds clear to auscultation    Other Findings  post-pubertal.      Anesthesia Plan  ASA Score- 2     Anesthesia Type- IV sedation with anesthesia with ASA Monitors.         Additional Monitors:     Airway Plan:            Plan Factors-Exercise tolerance (METS): >4 METS.    Chart reviewed.   Existing labs reviewed. Patient summary reviewed.    Patient is not a current smoker.              Induction- intravenous.    Postoperative Plan-         Informed Consent- Anesthetic plan and risks discussed with patient.  I personally reviewed this patient with the CRNA. Discussed and agreed on the Anesthesia Plan with the CRNA..      NPO Status:  Vitals Value Taken Time   Date of last liquid 03/11/25 03/11/25 1038   Time of last liquid 0700 03/11/25 1038   Date of last solid 03/09/25 03/11/25 1038   Time of last solid 2200 03/11/25 1038

## 2025-03-11 NOTE — ANESTHESIA POSTPROCEDURE EVALUATION
Post-Op Assessment Note    CV Status:  Stable  Pain Score: 0    Pain management: adequate       Mental Status:  Arousable and sleepy   Hydration Status:  Stable   PONV Controlled:  Controlled   Airway Patency:  Patent     Post Op Vitals Reviewed: Yes    No anethesia notable event occurred.    Staff: CRNA           Last Filed PACU Vitals:  Vitals Value Taken Time   Temp     Pulse 71    /50    Resp 14    SpO2 99

## 2025-03-11 NOTE — TELEPHONE ENCOUNTER
FORWARD COMPLETED SMART PHRASE TO CLINICAL DEPARTMENT  Scheduled date of capsule (as of today): 3/17/2025  Location of capsule: [facility]  Diabetic instructions given: na  On Iron tablets, advised to stop 5-7 days prior: Yes  Pt reports difficulty swallowing:   No  Instructions reviewed with patient by: CAMRON  Instructions sent to patient via/date:  Given to patient/3/11/2025

## 2025-03-14 PROCEDURE — 88305 TISSUE EXAM BY PATHOLOGIST: CPT | Performed by: SPECIALIST

## 2025-03-15 ENCOUNTER — NURSE TRIAGE (OUTPATIENT)
Dept: OTHER | Facility: OTHER | Age: 85
End: 2025-03-15

## 2025-03-15 ENCOUNTER — TELEPHONE (OUTPATIENT)
Dept: OTHER | Facility: OTHER | Age: 85
End: 2025-03-15

## 2025-03-15 NOTE — TELEPHONE ENCOUNTER
"Reason for Disposition  • Question about upcoming scheduled surgery, procedure or test, no triage required, and triager able to answer question    Answer Assessment - Initial Assessment Questions  1. REASON FOR CALL: \"What is the main reason for your call?\" or \"How can I best help you?\"        Patient has two appts scheduled on Monday- gi capsule endoscopy and C&R appt. She wanted to know which one would be more important to go to. She would like to cancel one of them, as \"two appts would be two much in one day.\"     2. SYMPTOMS : \"Do you have any symptoms?\"         No    3. OTHER QUESTIONS: \"Do you have any other questions?\"  No    Has had hemorrhoids bleeding for years per patient.    Protocols used: Information Only Call - No Triage-Adult-    "

## 2025-03-15 NOTE — TELEPHONE ENCOUNTER
Pt accidentally canceled apt via text instead of confirming it. Reached out to Ghazal to get the appointment back on the schedule.    Pt was able to confirm appointment for 4/16.

## 2025-03-15 NOTE — TELEPHONE ENCOUNTER
Patient is calling regarding cancelling an appointment.    Date/Time: 3/17 at 1pm     Patient was rescheduled: YES [x] NO []    Patient requesting call back to reschedule: YES [] NO [x]       Patient rescheduled to 4/16 at 1020am

## 2025-03-15 NOTE — TELEPHONE ENCOUNTER
FOLLOW UP: no follow up needed    REASON FOR CONVERSATION: Advice Only    SYMPTOMS: none    OTHER: Patient would like to reschedule her C&R appt. She has her endoscopy scheduled on Monday at 830am     DISPOSITION: Home Care (Information or Advice Only Call)

## 2025-03-17 ENCOUNTER — RESULTS FOLLOW-UP (OUTPATIENT)
Dept: GASTROENTEROLOGY | Facility: CLINIC | Age: 85
End: 2025-03-17

## 2025-03-17 ENCOUNTER — PROCEDURE VISIT (OUTPATIENT)
Dept: GASTROENTEROLOGY | Facility: CLINIC | Age: 85
End: 2025-03-17
Attending: INTERNAL MEDICINE
Payer: COMMERCIAL

## 2025-03-17 DIAGNOSIS — D50.8 OTHER IRON DEFICIENCY ANEMIA: ICD-10-CM

## 2025-03-17 DIAGNOSIS — D50.0 IRON DEFICIENCY ANEMIA DUE TO CHRONIC BLOOD LOSS: ICD-10-CM

## 2025-03-17 PROCEDURE — PBNCHG PB NO CHARGE PLACEHOLDER: Performed by: INTERNAL MEDICINE

## 2025-03-17 PROCEDURE — 91110 GI TRC IMG INTRAL ESOPH-ILE: CPT | Performed by: INTERNAL MEDICINE

## 2025-03-17 NOTE — PROGRESS NOTES
Patient here for capsule endoscopy.  Patient tolerated bowel prep.  Remained NPO after midnight.  Reviewed capsule and instructions.  Patient verbalized understanding.  Patient to return at 4:15.

## 2025-03-20 ENCOUNTER — TELEPHONE (OUTPATIENT)
Age: 85
End: 2025-03-20

## 2025-03-20 ENCOUNTER — TELEPHONE (OUTPATIENT)
Dept: GASTROENTEROLOGY | Facility: CLINIC | Age: 85
End: 2025-03-20

## 2025-03-20 NOTE — TELEPHONE ENCOUNTER
Pt received letter in portal my chart can not log in to my chart provided her ph # she called said will take 24 hours. Advised that letter is to schedule her next screening breast imaging exam and to call the ph # [phone].

## 2025-03-20 NOTE — TELEPHONE ENCOUNTER
Reviewed results of capsule endoscopy with patient:  Normal study no bleeding lesion seen.  Has plans for follow-up with colorectal surgery to address intermittently bleeding hemorrhoids as recommended by Dr. Calvillo.  Also has follow-up scheduled with GI.  Will have follow-up blood count with PCP.  No further GI interventions necessary presently.  Advised that if anemia continues to worsen despite addressing the hemorrhoidal bleeding then hematology consultation may be recommended but not indicated presently.

## 2025-04-02 NOTE — PROGRESS NOTES
Name: Rosalba Wolfe      : 1940      MRN: [id number]  Encounter Provider: Ledy Reyes MD  Encounter Date: 4/3/2025   Encounter department: [facility]  :  Assessment & Plan           History of Present Illness {?Quick Links Encounters * My Last Note * Last Note in Specialty * Snapshot * Since Last Visit * History :64441}  HPI    Rosalba Wolfe presents referred by Dr. Sonia Calvillo iron deficiency anemia.     Last colonoscopy/EGD performed on 2025 by Dr. Calvillo, colonoscopy showed: Indication: Iron deficiency anemia.  Impression: Large, prolapsing (grade 4) hemorrhoids.  The ileocecal valve, appendiceal orifice, cecum, ascending colon, hepatic flexure, transverse colon, splenic flexure, descending colon, sigmoid colon, rectosigmoid and rectum appeared normal.    Capsule endoscopy performed on 3/17/2025.        Review of Systems    Objective {?Quick Links Trend Vitals * Enter New Vitals * Results Review * Timeline (Adult) * Labs * Imaging * Cardiology * Procedures * Lung Cancer Screening * Surgical eConsent :05175}  There were no vitals taken for this visit.     Physical Exam  {Administrative / Billing Section (Optional):88196}

## 2025-04-03 ENCOUNTER — OFFICE VISIT (OUTPATIENT)
Age: 85
End: 2025-04-03
Attending: INTERNAL MEDICINE
Payer: COMMERCIAL

## 2025-04-03 VITALS — WEIGHT: 138 LBS | HEIGHT: 64 IN | BODY MASS INDEX: 23.56 KG/M2

## 2025-04-03 DIAGNOSIS — D50.8 OTHER IRON DEFICIENCY ANEMIA: Primary | ICD-10-CM

## 2025-04-03 PROCEDURE — 99203 OFFICE O/P NEW LOW 30 MIN: CPT | Performed by: SURGERY

## 2025-04-03 PROCEDURE — 46500 INJECTION INTO HEMORRHOID(S): CPT | Performed by: SURGERY

## 2025-04-03 NOTE — PROGRESS NOTES
Name: Rosalba Wolfe      : 1940      MRN: [id number]  Encounter Provider: Ledy Reyes MD  Encounter Date: 4/3/2025   Encounter department: [facility] [place]  :  Assessment & Plan  Other iron deficiency anemia  Sclerotherapy of posterior internal hemorrhoids was performed today  Patient tolerated the procedure well  Discussed potential repeat procedure if she continues to have episodes of bleeding  She will follow-up in 2 months to assess symptoms  Orders:    Ambulatory Referral to Colorectal Surgery           History of Present Illness   HPI    Rosalba Wolfe is a 84 y.o. female who is referred by Dr. Sonia Calvillo iron deficiency anemia.     Last colonoscopy/EGD performed on 2025 by Dr. Calvillo, colonoscopy showed: Indication: Iron deficiency anemia.  Impression: Large, prolapsing (grade 4) hemorrhoids.  The ileocecal valve, appendiceal orifice, cecum, ascending colon, hepatic flexure, transverse colon, splenic flexure, descending colon, sigmoid colon, rectosigmoid and rectum appeared normal.    Capsule endoscopy performed on 3/17/2025.      She states that she has lumps that protrude from the rectum. Sometimes they spontaneously reduce. Other times she digitally reduces them. She can experience irritation and discomfort.     She reports frequent difficult defecation and constipation. She eats prunes, drinks prune juice, and takes stool softeners as needed. Less frequently she takes Miralax as needed. She reports nausea when she is constipated.     She has 1 bowel movement daily. The consistency of the stool varies.     She experiences frequent painless rectal bleeding a few times a week. This is blood upon wiping and in the toilet bowl.     She has a family history of colorectal cancer (father).     Review of Systems   Constitutional:  Negative for chills and fever.   HENT:  Negative for ear pain and sore throat.    Eyes:  Negative for pain and visual  "disturbance.   Respiratory:  Negative for cough and shortness of breath.    Cardiovascular:  Negative for chest pain and palpitations.   Gastrointestinal:  Positive for anal bleeding. Negative for abdominal pain and vomiting.   Genitourinary:  Negative for dysuria and hematuria.   Musculoskeletal:  Negative for arthralgias and back pain.   Skin:  Negative for color change and rash.   Neurological:  Negative for seizures and syncope.   All other systems reviewed and are negative.      Objective   Ht 5' 4\" (1.626 m)   Wt 62.6 kg (138 lb)   BMI 23.69 kg/m²      Physical Exam  Vitals and nursing note reviewed.   Constitutional:       General: She is not in acute distress.     Appearance: She is well-developed.   HENT:      Head: Normocephalic and atraumatic.   Eyes:      Conjunctiva/sclera: Conjunctivae normal.   Cardiovascular:      Rate and Rhythm: Normal rate and regular rhythm.      Heart sounds: No murmur heard.  Pulmonary:      Effort: Pulmonary effort is normal. No respiratory distress.      Breath sounds: Normal breath sounds.   Abdominal:      Palpations: Abdomen is soft.      Tenderness: There is no abdominal tenderness.   Genitourinary:     Comments: Minimal external hemorrhoidal tissue  Strong tone on digital anorectal exam  Anoscopy showing large internal hemorrhoids  Musculoskeletal:         General: No swelling.      Cervical back: Neck supple.   Skin:     General: Skin is warm and dry.      Capillary Refill: Capillary refill takes less than 2 seconds.   Neurological:      Mental Status: She is alert.   Psychiatric:         Mood and Affect: Mood normal.     Lower Endoscopy    Date/Time: 4/3/2025 1:40 PM    Performed by: Ledy Reyes MD  Authorized by: Ledy Reyes MD    Verbal consent obtained?: Yes    Risks and benefits: Risks, benefits and alternatives were discussed    Consent given by:  Patient  Patient identity confirmed:  Verbally with patient and provided demographic " data  Time out: Immediately prior to the procedure a time out was called    Patient sedated: No    Scope type:  Anoscope  External exam performed: Yes    Digital exam performed: Yes    Internal hemorrhoids: Yes    Hemorrhoid Sclerotherapy    Date/Time: 4/3/2025 1:40 PM    Performed by: Ledy Reyes MD  Authorized by: Ledy Reyes MD    Verbal consent obtained?: Yes    Risks and benefits: Risks, benefits and alternatives were discussed    Consent given by:  Patient  Time Out:     Time out: Immediately prior to the procedure a time out was called    Patient identity confirmed:  Verbally with patient and provided demographic data  Procedure:    A total of 1cc of 2 mL sodium tetradecyl sulfate 1 % was injected into a    posterior internal hemorrhoid using a 5cc syringe fitted with a spinal needle. The patient tolerated the procedure well.       Administrative Statements   I have spent a total time of 35 minutes in caring for this patient on the day of the visit/encounter including Diagnostic results, Prognosis, Risks and benefits of tx options, Instructions for management, Patient and family education, Importance of tx compliance, Risk factor reductions, Impressions, Counseling / Coordination of care, Documenting in the medical record, Reviewing/placing orders in the medical record (including tests, medications, and/or procedures), Obtaining or reviewing history  , and Communicating with other healthcare professionals .

## 2025-04-03 NOTE — PATIENT INSTRUCTIONS
Sclerotherapy was performed today. You have no restrictions.    Post-Operative Care Information      Resume high fiber diet. Fiber supplementation with Metamucil or Konsyl also recommended daily.       Your first bowel movement may take up to 3 days after surgery. THIS IS OFTEN PAINFUL.      While taking narcotic pain medication, you should remain on an over-the-counter stool softener such as Colace (one tablet twice daily). For constipation Miralax can be taken up to three times daily.     Keep a clean, dry gauze over the wound if you have bleeding or drainage. It is OK to shower. Clean the area with only water and avoid soaps, lotions, and ointments in the area.    Pain is to be expected after surgery. Ibuprofen (400?600 mg) every 6?8 hours and Tylenol (650 mg) every 6 hours is an excellent alternative in addition or as a substitute to your narcotic pain medication.     Rectal bleeding or drainage is normal after surgery.       Nausea is a common complaint post op. This can be associated with the narcotic pain medications, anesthesia as well as with severe constipation.     Driving may be resumed when all off all narcotic pain medications and you can turn or twist your body without hesitation.    If you are unable to urinate within 8 hours after surgery, please call the office at [phone]    Frequent warm tub soaks or warm showers are often soothing, we suggest 3 to 4 times daily.    After bowel movements, you may wash with a hand shower or warm tub soak.    No strenuous activity (i.e., Running, jogging, swimming, or weightlifting) for up to one week after surgery.     When will I receive follow-up care?      You should be scheduled for a post-op appointment within 6-8 weeks after surgery, unless otherwise instructed on your discharge summary. If you do not have an existing appointment at the time of discharge, please call our office at [phone].    Call the office at [phone] immediately if you have a  fever, chills, excessive sweating, difficulty urinating, or excessive/profuse bleeding with clots.

## 2025-04-04 NOTE — ASSESSMENT & PLAN NOTE
Sclerotherapy of posterior internal hemorrhoids was performed today  Patient tolerated the procedure well  Discussed potential repeat procedure if she continues to have episodes of bleeding  She will follow-up in 2 months to assess symptoms  Orders:    Ambulatory Referral to Colorectal Surgery

## 2025-05-06 ENCOUNTER — HOSPITAL ENCOUNTER (OUTPATIENT)
Dept: MAMMOGRAPHY | Facility: CLINIC | Age: 85
Discharge: HOME | End: 2025-05-06
Payer: COMMERCIAL

## 2025-05-06 VITALS — BODY MASS INDEX: 23.56 KG/M2 | WEIGHT: 138 LBS | HEIGHT: 64 IN

## 2025-05-06 DIAGNOSIS — Z12.31 ENCOUNTER FOR SCREENING MAMMOGRAM FOR MALIGNANT NEOPLASM OF BREAST: ICD-10-CM

## 2025-05-06 PROCEDURE — 77067 SCR MAMMO BI INCL CAD: CPT

## 2025-05-06 PROCEDURE — 77063 BREAST TOMOSYNTHESIS BI: CPT

## 2025-05-22 ENCOUNTER — TELEPHONE (OUTPATIENT)
Age: 85
End: 2025-05-22

## 2025-05-22 NOTE — TELEPHONE ENCOUNTER
VM changed pt's appointment with Dr Reyes from 6/4 at 11:20 to 6/3 at 10:20 [facility].  Patient to confirm.

## 2025-06-02 NOTE — PROGRESS NOTES
Name: Rosalba Wolfe      : 1940      MRN: [id number]  Encounter Provider: Ledy Reyes MD  Encounter Date: 6/3/2025   Encounter department: [facility] [place]  :  Assessment & Plan           History of Present Illness {?Quick Links Encounters * My Last Note * Last Note in Specialty * Snapshot * Since Last Visit * History :32790}  HPI    Rosalba Wolfe presents for follow up to sclerotherapy on 4/3/2025.    Review of Systems    Objective {?Quick Links Trend Vitals * Enter New Vitals * Results Review * Timeline (Adult) * Labs * Imaging * Cardiology * Procedures * Lung Cancer Screening * Surgical eConsent :13580}    There were no vitals taken for this visit.     Physical Exam  {Administrative / Billing Section (Optional):61203}

## 2025-06-03 ENCOUNTER — OFFICE VISIT (OUTPATIENT)
Age: 85
End: 2025-06-03
Payer: COMMERCIAL

## 2025-06-03 VITALS — WEIGHT: 138 LBS | HEIGHT: 64 IN | BODY MASS INDEX: 23.56 KG/M2

## 2025-06-03 DIAGNOSIS — K64.8 INTERNAL HEMORRHOIDS: Primary | ICD-10-CM

## 2025-06-03 PROCEDURE — 99212 OFFICE O/P EST SF 10 MIN: CPT | Performed by: SURGERY

## 2025-06-03 PROCEDURE — 46500 INJECTION INTO HEMORRHOID(S): CPT | Performed by: SURGERY

## 2025-06-03 NOTE — PATIENT INSTRUCTIONS
Sclerotherapy was performed today. You have no restrictions.    High fiber diet/increased hydration, 20-30 grams fiber per day, increased fruits/vegetables    Start fiber supplementation with benefiber. You may put this in your coffee/tea/juice in the morning. Start with 2 tsp once per day. If you experience bloating or gassiness, you may start with 1 tsp once per day. You may increase fiber supplementation to UP TO 2 tsp three times per day in order to achieve 1 formed bowel movement once per day.    Aim to drink at least 64 oz of water per day      High Fiber Diet   AMBULATORY CARE:   A high-fiber diet  includes foods that have a high amount of fiber. Fiber is the part of fruits, vegetables, and grains that is not broken down by your body. Fiber keeps your bowel movements regular. Fiber can also help lower your cholesterol level, control blood sugar in people with diabetes, and relieve constipation. Fiber can also help you control your weight because it helps you feel full faster. Most adults should eat 25 to 35 grams of fiber each day. Talk to your dietitian or healthcare provider about the amount of fiber you need.  Good sources of fiber:       Foods with at least 4 grams of fiber per serving:      ? to ½ cup of high-fiber cereal (check the nutrition label on the box)    ½ cup of blackberries or raspberries    4 dried prunes    1 cooked artichoke    ½ cup of cooked legumes, such as lentils, or red, kidney, and lockhart beans    Foods with 1 to 3 grams of fiber per servin slice of whole-wheat, pumpernickel, or rye bread    ½ cup of cooked brown rice    4 whole-wheat crackers    1 cup of oatmeal    ½ cup of cereal with 1 to 3 grams of fiber per serving (check the nutrition label on the box)    1 small piece of fruit, such as an apple, banana, pear, kiwi, or orange    3 dates    ½ cup of canned apricots, fruit cocktail, peaches, or pears    ½ cup of raw or cooked vegetables, such as carrots, cauliflower,  cabbage, spinach, squash, or corn  Ways that you can increase fiber in your diet:   Choose brown or wild rice instead of white rice.     Use whole wheat flour in recipes instead of white or all-purpose flour.     Add beans and peas to casseroles or soups.     Choose fresh fruit and vegetables with peels or skins on instead of juices.    Other diet guidelines to follow:   Add fiber to your diet slowly.  You may have abdominal discomfort, bloating, and gas if you add fiber to your diet too quickly.     Drink plenty of liquids as you add fiber to your diet.  You may have nausea or develop constipation if you do not drink enough water. Ask how much liquid to drink each day and which liquids are best for you.    © Copyright [employer] 2023 Information is for End User's use only and may not be sold, redistributed or otherwise used for commercial purposes.  The above information is an  only. It is not intended as medical advice for individual conditions or treatments. Talk to your doctor, nurse or pharmacist before following any medical regimen to see if it is safe and effective for you.

## 2025-06-03 NOTE — PROGRESS NOTES
Name: Rosalba Wolfe      : 1940      MRN: [id number]  Encounter Provider: Ledy Reyes MD  Encounter Date: 6/3/2025   [facility]  :  Assessment & Plan  Internal hemorrhoids  Sclerotherapy of anterior midline internal hemorrhoids was performed today  Patient tolerated the procedure well  She had responded fairly well to her last round of sclerotherapy  She will follow-up in the office in 3 months         Assessment & Plan        History of Present Illness   History of Present Illness    Rosalba Wolfe is a 84 y.o. female who presents or follow up to sclerotherapy on 4/3/2025.    Patient states she was doing well until this morning, when she noticed some more pronounced bleeding in the toilet bowl.     Last colonoscopy/EGD performed on 2025 by Dr. Calvillo.     Capsule endoscopy performed on 3/17/2025.     History obtained from: patient    Review of Systems   Constitutional:  Negative for chills and fever.   HENT:  Negative for ear pain and sore throat.    Eyes:  Negative for pain and visual disturbance.   Respiratory:  Negative for cough and shortness of breath.    Cardiovascular:  Negative for chest pain and palpitations.   Gastrointestinal:  Positive for blood in stool. Negative for abdominal pain and vomiting.   Genitourinary:  Negative for dysuria and hematuria.   Musculoskeletal:  Negative for arthralgias and back pain.   Skin:  Negative for color change and rash.   Neurological:  Negative for seizures and syncope.   All other systems reviewed and are negative.    Past Medical History   Past Medical History[1]  Past Surgical History[2]  Family History[3]   reports that she has quit smoking. She has never used smokeless tobacco. She reports current alcohol use. She reports that she does not use drugs.  Current Outpatient Medications   Medication Instructions    betamethasone dipropionate (DIPROSONE) 0.05 % cream As needed     "cetirizine (ZYRTEC) 10 mg, As needed    cholecalciferol (VITAMIN D3) 1,000 units tablet Take by mouth    citalopram (CELEXA) 20 mg, Daily    estradiol (ESTRACE) 0.1 mg/g vaginal cream Insert into the vagina    ferrous sulfate 325 mg, Every other day    latanoprost (XALATAN) 0.005 % ophthalmic solution     Magnesium 200 MG TABS     polyethylene glycol (MIRALAX) 238 g, Oral, Once, Take 238 g my mouth. Use as directed    Synthroid 75 mcg, Daily    Turmeric 500 MG CAPS    Allergies[4]      Objective   Ht 5' 4\" (1.626 m)   Wt 62.6 kg (138 lb)   BMI 23.69 kg/m²      Physical Exam  Vitals and nursing note reviewed.   Constitutional:       General: She is not in acute distress.     Appearance: She is well-developed.   HENT:      Head: Normocephalic and atraumatic.     Eyes:      Conjunctiva/sclera: Conjunctivae normal.       Cardiovascular:      Rate and Rhythm: Normal rate and regular rhythm.      Heart sounds: No murmur heard.  Pulmonary:      Effort: Pulmonary effort is normal. No respiratory distress.      Breath sounds: Normal breath sounds.   Abdominal:      Palpations: Abdomen is soft.      Tenderness: There is no abdominal tenderness.   Genitourinary:     Comments: Small external hemorrhoids  Strong tone additional anorectal exam  Anoscopy showing large anterior midline internal hemorrhoids with evidence of recent bleeding    Musculoskeletal:         General: No swelling.      Cervical back: Neck supple.     Skin:     General: Skin is warm and dry.      Capillary Refill: Capillary refill takes less than 2 seconds.     Neurological:      Mental Status: She is alert.     Psychiatric:         Mood and Affect: Mood normal.     Lower Endoscopy    Date/Time: 6/3/2025 10:20 AM    Performed by: Ledy Reyes MD  Authorized by: Ledy Reyes MD    Verbal consent obtained?: Yes    Risks and benefits: Risks, benefits and alternatives were discussed    Consent given by:  Patient  Patient identity " confirmed:  Verbally with patient and provided demographic data  Time out: Immediately prior to the procedure a time out was called    Patient sedated: No    Scope type:  Anoscope  External exam performed: Yes    Digital exam performed: Yes    Internal hemorrhoids: Yes    Hemorrhoid Sclerotherapy    Date/Time: 6/3/2025 10:20 AM    Performed by: Ledy Reyes MD  Authorized by: Ledy Reyes MD    Verbal consent obtained?: Yes    Risks and benefits: Risks, benefits and alternatives were discussed    Consent given by:  Patient  Time Out:     Time out: Immediately prior to the procedure a time out was called    Patient identity confirmed:  Verbally with patient and provided demographic data  Procedure:    A total of 1cc of 2 mL sodium tetradecyl sulfate 1 % was injected into a    bleeding anterior hemorrhoid using a 5cc syringe fitted with a spinal needle. The patient tolerated the procedure well.       Physical Exam      Results    Administrative Statements   I have spent a total time of 17 minutes in caring for this patient on the day of the visit/encounter including Diagnostic results, Prognosis, Risks and benefits of tx options, Instructions for management, Patient and family education, Importance of tx compliance, Risk factor reductions, Impressions, Counseling / Coordination of care, Documenting in the medical record, Reviewing/placing orders in the medical record (including tests, medications, and/or procedures), Obtaining or reviewing history  , and Communicating with other healthcare professionals .         [1]   Past Medical History:  Diagnosis Date    Anemia     Anxiety     Cancer (HCC) 2015    Bladder    Chronic constipation     Disease of thyroid gland     Environmental allergies     GERD (gastroesophageal reflux disease)     Hyperlipidemia     Hypertension     Hypothyroidism     Irritable bowel syndrome     Osteoarthritis     Pelvic floor weakness in female     Thyroid disorder     [2]   Past Surgical History:  Procedure Laterality Date    CARPAL TUNNEL RELEASE Bilateral     COLONOSCOPY  11/28/2017    Dr. Hughes    HIP SURGERY      HYSTERECTOMY      age 40    JOINT REPLACEMENT Left     hip    OOPHORECTOMY Right     age 40   [3]   Family History  Problem Relation Name Age of Onset    Ovarian cancer Mother  56    Colon cancer Father      Cancer Father  79        unsure primary. suspects colon    No Known Problems Maternal Grandmother      No Known Problems Maternal Grandfather      Breast cancer Paternal Grandmother  72    Cancer Paternal Grandfather  85        unsure primary    Colon cancer Maternal Aunt  90    No Known Problems Maternal Aunt      No Known Problems Maternal Aunt      Cancer Paternal Aunt          unknown primary or age of onset    Cancer Paternal Aunt          unknown primary or age of onset    Cancer Paternal Aunt          unknown primary or age of onset    Cancer Paternal Aunt          unknown primary or age of onset    Cancer Paternal Aunt          unknown primary or age of onset    Throat cancer Son  50    No Known Problems Son      No Known Problems Son      No Known Problems Son      Colon polyps Neg Hx     [4]   Allergies  Allergen Reactions    Ace Inhibitors     Azithromycin Nausea Only    Celecoxib Hives    Penicillins Hives    Sulfa Antibiotics Hives    Verapamil Other (See Comments)

## 2025-06-04 ENCOUNTER — TELEPHONE (OUTPATIENT)
Age: 85
End: 2025-06-04

## 2025-06-04 NOTE — TELEPHONE ENCOUNTER
Pt reports a burning/pinching sensation at the site she received sclerotherapy yesterday. Informed pt this is normal for a day or two as that is a sensitive area. Pt verbalized understanding.

## 2025-06-18 ENCOUNTER — TELEPHONE (OUTPATIENT)
Age: 85
End: 2025-06-18

## 2025-06-18 NOTE — TELEPHONE ENCOUNTER
VM informing patient appointment on 9/3/25 with Dr Reyes has been rescheduled to 9/10/25 at 10:40 a.m. [facility] office.

## 2025-07-30 ENCOUNTER — TELEPHONE (OUTPATIENT)
Age: 85
End: 2025-07-30

## 2025-08-05 ENCOUNTER — OFFICE VISIT (OUTPATIENT)
Age: 85
End: 2025-08-05
Payer: COMMERCIAL

## 2025-08-05 VITALS
HEIGHT: 64 IN | OXYGEN SATURATION: 98 % | BODY MASS INDEX: 23.56 KG/M2 | DIASTOLIC BLOOD PRESSURE: 50 MMHG | WEIGHT: 138 LBS | HEART RATE: 75 BPM | SYSTOLIC BLOOD PRESSURE: 108 MMHG

## 2025-08-05 DIAGNOSIS — E03.9 HYPOTHYROIDISM, UNSPECIFIED TYPE: ICD-10-CM

## 2025-08-05 DIAGNOSIS — R79.89 HIGH SERUM PARATHYROID HORMONE (PTH): ICD-10-CM

## 2025-08-05 DIAGNOSIS — D50.0 IRON DEFICIENCY ANEMIA DUE TO CHRONIC BLOOD LOSS: ICD-10-CM

## 2025-08-05 DIAGNOSIS — R21 RASH AND NONSPECIFIC SKIN ERUPTION: Primary | ICD-10-CM

## 2025-08-05 DIAGNOSIS — E55.9 VITAMIN D DEFICIENCY: ICD-10-CM

## 2025-08-05 PROCEDURE — 99214 OFFICE O/P EST MOD 30 MIN: CPT | Performed by: NURSE PRACTITIONER

## 2025-08-05 RX ORDER — LEVOTHYROXINE SODIUM 75 MCG
1 TABLET ORAL DAILY
COMMUNITY
Start: 2025-07-28

## 2025-08-19 ENCOUNTER — CONSULT (OUTPATIENT)
Dept: ENDOCRINOLOGY | Facility: HOSPITAL | Age: 85
End: 2025-08-19
Attending: NURSE PRACTITIONER
Payer: COMMERCIAL

## 2025-08-19 VITALS
HEART RATE: 74 BPM | DIASTOLIC BLOOD PRESSURE: 68 MMHG | WEIGHT: 138 LBS | SYSTOLIC BLOOD PRESSURE: 122 MMHG | HEIGHT: 64 IN | BODY MASS INDEX: 23.56 KG/M2

## 2025-08-19 DIAGNOSIS — E21.3 HYPERPARATHYROIDISM (HCC): Primary | ICD-10-CM

## 2025-08-19 DIAGNOSIS — Z78.0 POSTMENOPAUSAL: ICD-10-CM

## 2025-08-19 DIAGNOSIS — R79.89 HIGH SERUM PARATHYROID HORMONE (PTH): ICD-10-CM

## 2025-08-19 PROBLEM — Z96.642 PRESENCE OF LEFT ARTIFICIAL HIP JOINT: Status: ACTIVE | Noted: 2025-08-19

## 2025-08-19 PROBLEM — H91.90 UNSPECIFIED HEARING LOSS, UNSPECIFIED EAR: Status: ACTIVE | Noted: 2025-08-19

## 2025-08-19 PROBLEM — E78.2 MIXED HYPERLIPIDEMIA: Status: ACTIVE | Noted: 2025-08-19

## 2025-08-19 PROBLEM — Z85.51 HISTORY OF MALIGNANT NEOPLASM OF BLADDER: Status: ACTIVE | Noted: 2025-08-19

## 2025-08-19 PROBLEM — M17.0 OSTEOARTHRITIS OF BOTH KNEES: Status: ACTIVE | Noted: 2018-05-10

## 2025-08-19 PROBLEM — H35.3190 NONEXUDATIVE AGE-RELATED MACULAR DEGENERATION, UNSPECIFIED EYE, STAGE UNSPECIFIED: Status: ACTIVE | Noted: 2025-08-19

## 2025-08-19 PROBLEM — M16.11 UNILATERAL PRIMARY OSTEOARTHRITIS, RIGHT HIP: Status: ACTIVE | Noted: 2025-08-19

## 2025-08-19 PROBLEM — H35.369 DRUSEN (DEGENERATIVE) OF MACULA, UNSPECIFIED EYE: Status: ACTIVE | Noted: 2025-08-19

## 2025-08-19 PROBLEM — N81.89 PELVIC FLOOR WEAKNESS IN FEMALE: Status: ACTIVE | Noted: 2025-08-19

## 2025-08-19 PROBLEM — K64.3 FOURTH DEGREE HEMORRHOIDS: Status: ACTIVE | Noted: 2025-08-19

## 2025-08-19 PROBLEM — E03.9 HYPOTHYROIDISM: Status: ACTIVE | Noted: 2025-08-19

## 2025-08-19 PROBLEM — N81.11 CYSTOCELE, MIDLINE: Status: ACTIVE | Noted: 2025-08-19

## 2025-08-19 PROBLEM — F33.0 MAJOR DEPRESSIVE DISORDER, RECURRENT, MILD (HCC): Status: ACTIVE | Noted: 2025-08-19

## 2025-08-19 PROBLEM — F41.9 ANXIETY DISORDER, UNSPECIFIED: Status: ACTIVE | Noted: 2025-08-19

## 2025-08-19 PROBLEM — M85.80 OSTEOPENIA: Status: ACTIVE | Noted: 2025-08-19

## 2025-08-19 PROBLEM — H40.009 PREGLAUCOMA, UNSPECIFIED, UNSPECIFIED EYE: Status: ACTIVE | Noted: 2025-08-19

## 2025-08-19 PROBLEM — E83.52 HYPERCALCEMIA: Status: ACTIVE | Noted: 2025-08-19

## 2025-08-19 PROCEDURE — 99204 OFFICE O/P NEW MOD 45 MIN: CPT | Performed by: INTERNAL MEDICINE

## 2025-08-22 ENCOUNTER — TELEPHONE (OUTPATIENT)
Age: 85
End: 2025-08-22